# Patient Record
Sex: FEMALE | Race: WHITE | NOT HISPANIC OR LATINO | Employment: FULL TIME | RURAL
[De-identification: names, ages, dates, MRNs, and addresses within clinical notes are randomized per-mention and may not be internally consistent; named-entity substitution may affect disease eponyms.]

---
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
## 2021-08-03 ENCOUNTER — OFFICE VISIT (OUTPATIENT)
Dept: FAMILY MEDICINE CLINIC | Facility: CLINIC | Age: 21
End: 2021-08-03

## 2021-08-03 ENCOUNTER — LAB (OUTPATIENT)
Dept: LAB | Facility: HOSPITAL | Age: 21
End: 2021-08-03

## 2021-08-03 VITALS
HEART RATE: 84 BPM | WEIGHT: 105.2 LBS | OXYGEN SATURATION: 99 % | TEMPERATURE: 98.4 F | DIASTOLIC BLOOD PRESSURE: 60 MMHG | SYSTOLIC BLOOD PRESSURE: 95 MMHG | BODY MASS INDEX: 17.96 KG/M2 | HEIGHT: 64 IN

## 2021-08-03 DIAGNOSIS — Z30.09 COUNSELING FOR BIRTH CONTROL REGARDING INTRAUTERINE DEVICE (IUD): ICD-10-CM

## 2021-08-03 DIAGNOSIS — E55.9 VITAMIN D DEFICIENCY: ICD-10-CM

## 2021-08-03 DIAGNOSIS — E53.8 B12 DEFICIENCY: ICD-10-CM

## 2021-08-03 DIAGNOSIS — N93.9 ABNORMAL UTERINE BLEEDING (AUB): Primary | ICD-10-CM

## 2021-08-03 LAB
B-HCG UR QL: NEGATIVE
INTERNAL NEGATIVE CONTROL: NORMAL
INTERNAL POSITIVE CONTROL: NORMAL
Lab: NORMAL

## 2021-08-03 PROCEDURE — 83002 ASSAY OF GONADOTROPIN (LH): CPT | Performed by: STUDENT IN AN ORGANIZED HEALTH CARE EDUCATION/TRAINING PROGRAM

## 2021-08-03 PROCEDURE — 82306 VITAMIN D 25 HYDROXY: CPT | Performed by: STUDENT IN AN ORGANIZED HEALTH CARE EDUCATION/TRAINING PROGRAM

## 2021-08-03 PROCEDURE — 84443 ASSAY THYROID STIM HORMONE: CPT | Performed by: STUDENT IN AN ORGANIZED HEALTH CARE EDUCATION/TRAINING PROGRAM

## 2021-08-03 PROCEDURE — 83001 ASSAY OF GONADOTROPIN (FSH): CPT | Performed by: STUDENT IN AN ORGANIZED HEALTH CARE EDUCATION/TRAINING PROGRAM

## 2021-08-03 PROCEDURE — 85025 COMPLETE CBC W/AUTO DIFF WBC: CPT | Performed by: STUDENT IN AN ORGANIZED HEALTH CARE EDUCATION/TRAINING PROGRAM

## 2021-08-03 PROCEDURE — 81025 URINE PREGNANCY TEST: CPT | Performed by: STUDENT IN AN ORGANIZED HEALTH CARE EDUCATION/TRAINING PROGRAM

## 2021-08-03 PROCEDURE — 82670 ASSAY OF TOTAL ESTRADIOL: CPT | Performed by: STUDENT IN AN ORGANIZED HEALTH CARE EDUCATION/TRAINING PROGRAM

## 2021-08-03 PROCEDURE — 80053 COMPREHEN METABOLIC PANEL: CPT | Performed by: STUDENT IN AN ORGANIZED HEALTH CARE EDUCATION/TRAINING PROGRAM

## 2021-08-03 PROCEDURE — 84146 ASSAY OF PROLACTIN: CPT | Performed by: STUDENT IN AN ORGANIZED HEALTH CARE EDUCATION/TRAINING PROGRAM

## 2021-08-03 PROCEDURE — 99215 OFFICE O/P EST HI 40 MIN: CPT | Performed by: STUDENT IN AN ORGANIZED HEALTH CARE EDUCATION/TRAINING PROGRAM

## 2021-08-03 RX ORDER — ERGOCALCIFEROL 1.25 MG/1
50000 CAPSULE ORAL WEEKLY
COMMUNITY

## 2021-08-03 NOTE — PROGRESS NOTES
"Subjective:  Whit Chapin is a 20 y.o. female who presents for     AUB; states has never been an issue before, recently gave birth ~ 7 months ago, had some issues for the first 1-2 cycles postpartum but things went back to normal. However, this past cycle has been abnormal. States LMP started the 15th of last month, lasted 7 days, normal flow, usually heavy first day, uses ~ 4 per day, never bleeds through her pads. On the 24th ~ 2 days later had some pink, purple colored discharge for an additional 7 days. States was very light, denied changes in her daily routine, no increased sexual activity, no issues with dyspareunia, vaginal discharge, lad, rash, fever, chills. No COVID symptoms or exposures, did not get the vaccine. No fatigue, SOA, chest pain, ROSE. No issues with lactation, no complication with delivery. Was a NVD, only issue was low blood pressure, states this is a chronic issue. Takes prenatal vitamins, Vitamin b12, Vitamin D and albuterol as needed (~ once every couple of months.) Was on birth control in the past for desire to gain weight last year. Denied painful menses or menorrhagia. Was prescribed birth control, but has not begun. Has never had migraines with aura, blood clots, does vape.      Patient Active Problem List   Diagnosis   • Acute bronchitis   • Sleep disturbance   • Hx of dizziness     Vitals:    Vitals:    08/03/21 1322   BP: 95/60   BP Location: Left arm   Patient Position: Sitting   Cuff Size: Adult   Pulse: 84   Temp: 98.4 °F (36.9 °C)   SpO2: 99%   Weight: 47.7 kg (105 lb 3.2 oz)   Height: 162.6 cm (64\")     Body mass index is 18.06 kg/m².    Current Outpatient Medications:   •  albuterol (PROAIR RESPICLICK) 108 (90 Base) MCG/ACT inhaler, Inhale 2 puffs Every 4 (Four) Hours As Needed for Wheezing., Disp: 1 inhaler, Rfl: 5  •  Cyanocobalamin (B-12) 2500 MCG sublingual tablet, Place 1 tablet under the tongue Daily., Disp: , Rfl:   •  hydrOXYzine (ATARAX) 25 MG tablet, 1-2 tabs po " QHS prn sleep, Disp: 60 tablet, Rfl: 0  •  Magnesium Gluconate (MAGONATE BARBARA PO), Take 1 tablet by mouth Daily., Disp: , Rfl:   •  vitamin D (ERGOCALCIFEROL) 1.25 MG (31224 UT) capsule capsule, Take 50,000 Units by mouth 1 (One) Time Per Week., Disp: , Rfl:     Patient Active Problem List   Diagnosis   • Acute bronchitis   • Sleep disturbance   • Hx of dizziness     History reviewed. No pertinent surgical history.  Social History     Socioeconomic History   • Marital status: Single     Spouse name: Not on file   • Number of children: Not on file   • Years of education: Not on file   • Highest education level: Not on file   Tobacco Use   • Smoking status: Current Every Day Smoker   • Smokeless tobacco: Never Used   Vaping Use   • Vaping Use: Every day   • Start date: 2018   • Substances: Nicotine   • Devices: Disposable   • Passive vaping exposure Yes   Substance and Sexual Activity   • Alcohol use: Never   • Drug use: Never   • Sexual activity: Defer     History reviewed. No pertinent family history.  No visits with results within 6 Month(s) from this visit.   Latest known visit with results is:   Office Visit on 2019   Component Date Value Ref Range Status   • Glucose 2019 88  65 - 99 mg/dL Final   • BUN 2019 10  5 - 18 mg/dL Final   • Creatinine 2019 0.73  0.57 - 1.00 mg/dL Final   • Sodium 2019 142  136 - 145 mmol/L Final   • Potassium 2019 4.1  3.5 - 5.2 mmol/L Final   • Chloride 2019 105  98 - 107 mmol/L Final   • CO2 2019 23.8  22.0 - 29.0 mmol/L Final   • Calcium 2019 9.6  8.4 - 10.2 mg/dL Final   • Total Protein 2019 7.0  6.0 - 8.0 g/dL Final   • Albumin 2019 4.60* 3.20 - 4.50 g/dL Final   • ALT (SGPT) 2019 11  8 - 29 U/L Final   • AST (SGOT) 2019 16  14 - 37 U/L Final   • Alkaline Phosphatase 2019 58  45 - 101 U/L Final   • Total Bilirubin 2019 0.4  0.2 - 1.0 mg/dL Final   • eGFR Non African Amer 2019   >60  mL/min/1.73 Final    Unable to calculate GFR, patient age <=18.   • eGFR   Amer 08/02/2019   >60 mL/min/1.73 Final    Unable to calculate GFR, patient age <=18.   • Globulin 08/02/2019 2.4  gm/dL Final   • A/G Ratio 08/02/2019 1.9  g/dL Final   • BUN/Creatinine Ratio 08/02/2019 13.7  7.0 - 25.0 Final   • Anion Gap 08/02/2019 13.2  5.0 - 15.0 mmol/L Final   • Hemoglobin A1C 08/02/2019 5.20  4.80 - 5.60 % Final   • Iron 08/02/2019 97  37 - 145 mcg/dL Final   • Iron Saturation 08/02/2019 24  20 - 50 % Final   • Transferrin 08/02/2019 275  200 - 360 mg/dL Final   • TIBC 08/02/2019 410  mcg/dL Final   • TSH 08/02/2019 2.180  0.500 - 4.300 mIU/mL Final   • WBC 08/02/2019 5.18  3.40 - 10.80 10*3/mm3 Final   • RBC 08/02/2019 4.55  3.77 - 5.28 10*6/mm3 Final   • Hemoglobin 08/02/2019 13.0  12.0 - 15.9 g/dL Final   • Hematocrit 08/02/2019 40.0  34.0 - 46.6 % Final   • MCV 08/02/2019 87.9  79.0 - 97.0 fL Final   • MCH 08/02/2019 28.6  26.6 - 33.0 pg Final   • MCHC 08/02/2019 32.5  31.5 - 35.7 g/dL Final   • RDW 08/02/2019 12.8  12.3 - 15.4 % Final   • RDW-SD 08/02/2019 41.0  37.0 - 54.0 fl Final   • MPV 08/02/2019 12.0  6.0 - 12.0 fL Final   • Platelets 08/02/2019 280  140 - 450 10*3/mm3 Final   • Neutrophil % 08/02/2019 36.3* 42.7 - 76.0 % Final   • Lymphocyte % 08/02/2019 42.3  19.6 - 45.3 % Final   • Monocyte % 08/02/2019 9.8  5.0 - 12.0 % Final   • Eosinophil % 08/02/2019 10.0* 0.3 - 6.2 % Final   • Basophil % 08/02/2019 1.4  0.0 - 2.0 % Final   • Immature Grans % 08/02/2019 0.2  0.0 - 0.5 % Final   • Neutrophils, Absolute 08/02/2019 1.88  1.70 - 7.00 10*3/mm3 Final   • Lymphocytes, Absolute 08/02/2019 2.19  0.70 - 3.10 10*3/mm3 Final   • Monocytes, Absolute 08/02/2019 0.51  0.10 - 0.90 10*3/mm3 Final   • Eosinophils, Absolute 08/02/2019 0.52* 0.00 - 0.40 10*3/mm3 Final   • Basophils, Absolute 08/02/2019 0.07  0.00 - 0.30 10*3/mm3 Final   • Immature Grans, Absolute 08/02/2019 0.01  0.00 - 0.05 10*3/mm3 Final   •  nRBC 08/02/2019 0.0  0.0 - 0.2 /100 WBC Final      No image results found.      [unfilled]  Immunization History   Administered Date(s) Administered   • Tdap 11/09/2020     The following portions of the patient's history were reviewed and updated as appropriate: allergies, current medications, past family history, past medical history, past social history, past surgical history and problem list.    PHQ-9 Total Score: 0         Physical Exam  Constitutional:       Appearance: Normal appearance.   HENT:      Head: Normocephalic and atraumatic.      Right Ear: External ear normal.      Left Ear: External ear normal.   Eyes:      General:         Right eye: No discharge.         Left eye: No discharge.      Conjunctiva/sclera: Conjunctivae normal.   Cardiovascular:      Rate and Rhythm: Normal rate and regular rhythm.      Pulses: Normal pulses.      Heart sounds: Normal heart sounds. No murmur heard.     Pulmonary:      Effort: Pulmonary effort is normal. No respiratory distress.      Breath sounds: Normal breath sounds.   Abdominal:      General: There is no distension.      Palpations: Abdomen is soft.      Tenderness: There is no abdominal tenderness.   Genitourinary:     Comments: Patient deferred  Musculoskeletal:      Cervical back: Normal range of motion.      Right lower leg: No edema.      Left lower leg: No edema.   Lymphadenopathy:      Cervical: No cervical adenopathy.   Neurological:      Mental Status: She is alert. Mental status is at baseline.   Psychiatric:         Mood and Affect: Mood normal.         Behavior: Behavior normal.       Assessment/Plan    Diagnosis Plan   1. Abnormal uterine bleeding (AUB)  CBC & Differential    Comprehensive Metabolic Panel    Vitamin D 25 Hydroxy    TSH Rfx On Abnormal To Free T4    Ambulatory Referral to Obstetrics / Gynecology    POCT pregnancy, urine    Follicle stimulating hormone    Prolactin    Luteinizing hormone    Estradiol   2. Counseling for birth control  regarding intrauterine device (IUD)  Ambulatory Referral to Obstetrics / Gynecology   3. B12 deficiency  Cyanocobalamin (B-12) 2500 MCG sublingual tablet    Vitamin B12   4. Vitamin D deficiency  vitamin D (ERGOCALCIFEROL) 1.25 MG (32431 UT) capsule capsule    Vitamin D 25 Hydroxy      Orders Placed This Encounter   Procedures   • Comprehensive Metabolic Panel     Order Specific Question:   Release to patient     Answer:   Immediate   • Vitamin D 25 Hydroxy     Order Specific Question:   Release to patient     Answer:   Immediate   • TSH Rfx On Abnormal To Free T4     Order Specific Question:   Release to patient     Answer:   Immediate   • Follicle stimulating hormone     Order Specific Question:   Release to patient     Answer:   Immediate   • Prolactin     Order Specific Question:   Release to patient     Answer:   Immediate   • Luteinizing hormone     Order Specific Question:   Release to patient     Answer:   Immediate   • Estradiol     Order Specific Question:   Release to patient     Answer:   Immediate   • CBC Auto Differential   • Vitamin B12     Order Specific Question:   Release to patient     Answer:   Immediate   • Ambulatory Referral to Obstetrics / Gynecology     Referral Priority:   Routine     Referral Type:   Consultation     Referral Reason:   Specialty Services Required     Requested Specialty:   Obstetrics and Gynecology     Number of Visits Requested:   1   • POCT pregnancy, urine     Order Specific Question:   Release to patient     Answer:   Immediate   • CBC & Differential     Order Specific Question:   Manual Differential     Answer:   No     Abnormal uterine bleeding; has never happened before, as such, labs as above, no issues with lactation, and has had regular cycles postpartum. No dyspareunia, fever, chills, abdominal pain, tenderness, vaginal discharge, reassuring.  Offered STI screening, patient declined.  Labs as above, discussed importance of birth control especially long-acting  reversible contraceptive such as IUD, patient to consider, will refer to OB/GYN for possible placement, further management.  Declined medication refill for birth control at this time, states has one at the pharmacy from her previous provider in [facility],  will begin today, counseled on use, possible adverse effects, no history of clotting, migraines with aura, tobacco use, reassuring.  UPT negative today, reassuring.    Patient is new to me, as such, reviewed available previous records and lab work.  Normal TSH in 2019, prenatal records not available, will obtain.    Total time examining evaluating the patient, completing orders and counseling was 65min.         This document has been electronically signed by Gabriel Dennis MD on August 3, 2021 14:29 CDT

## 2021-08-03 NOTE — PATIENT INSTRUCTIONS
Levonorgestrel intrauterine device (IUD)  What is this medicine?  LEVONORGESTREL IUD (LAWANDA grewal) is a contraceptive (birth control) device. The device is placed inside the uterus by a healthcare professional. It is used to prevent pregnancy. This device can also be used to treat heavy bleeding that occurs during your period.  This medicine may be used for other purposes; ask your health care provider or pharmacist if you have questions.  COMMON BRAND NAME(S): Domenicena, LILETTA, Mirena, Ailyn  What should I tell my health care provider before I take this medicine?  They need to know if you have any of these conditions:  · abnormal Pap smear  · cancer of the breast, uterus, or cervix  · diabetes  · endometritis  · genital or pelvic infection now or in the past  · have more than one sexual partner or your partner has more than one partner  · heart disease  · history of an ectopic or tubal pregnancy  · immune system problems  · IUD in place  · liver disease or tumor  · problems with blood clots or take blood-thinners  · seizures  · use intravenous drugs  · uterus of unusual shape  · vaginal bleeding that has not been explained  · an unusual or allergic reaction to levonorgestrel, other hormones, silicone, or polyethylene, medicines, foods, dyes, or preservatives  · pregnant or trying to get pregnant  · breast-feeding  How should I use this medicine?  This device is placed inside the uterus by a health care professional.  Talk to your pediatrician regarding the use of this medicine in children. Special care may be needed.  Overdosage: If you think you have taken too much of this medicine contact a poison control center or emergency room at once.  NOTE: This medicine is only for you. Do not share this medicine with others.  What if I miss a dose?  This does not apply. Depending on the brand of device you have inserted, the device will need to be replaced every 3 to 6 years if you wish to continue using this type  of birth control.  What may interact with this medicine?  Do not take this medicine with any of the following medications:  · amprenavir  · bosentan  · fosamprenavir  This medicine may also interact with the following medications:  · aprepitant  · armodafinil  · barbiturate medicines for inducing sleep or treating seizures  · bexarotene  · boceprevir  · griseofulvin  · medicines to treat seizures like carbamazepine, ethotoin, felbamate, oxcarbazepine, phenytoin, topiramate  · modafinil  · pioglitazone  · rifabutin  · rifampin  · rifapentine  · some medicines to treat HIV infection like atazanavir, efavirenz, indinavir, lopinavir, nelfinavir, tipranavir, ritonavir  · Ayo's wort  · warfarin  This list may not describe all possible interactions. Give your health care provider a list of all the medicines, herbs, non-prescription drugs, or dietary supplements you use. Also tell them if you smoke, drink alcohol, or use illegal drugs. Some items may interact with your medicine.  What should I watch for while using this medicine?  Visit your doctor or health care professional for regular check ups. See your doctor if you or your partner has sexual contact with others, becomes HIV positive, or gets a sexual transmitted disease.  This product does not protect you against HIV infection (AIDS) or other sexually transmitted diseases.  You can check the placement of the IUD yourself by reaching up to the top of your vagina with clean fingers to feel the threads. Do not pull on the threads. It is a good habit to check placement after each menstrual period. Call your doctor right away if you feel more of the IUD than just the threads or if you cannot feel the threads at all.  The IUD may come out by itself. You may become pregnant if the device comes out. If you notice that the IUD has come out use a backup birth control method like condoms and call your health care provider.  Using tampons will not change the position of the  IUD and are okay to use during your period.  This IUD can be safely scanned with magnetic resonance imaging (MRI) only under specific conditions. Before you have an MRI, tell your healthcare provider that you have an IUD in place, and which type of IUD you have in place.  What side effects may I notice from receiving this medicine?  Side effects that you should report to your doctor or health care professional as soon as possible:  · allergic reactions like skin rash, itching or hives, swelling of the face, lips, or tongue  · fever, flu-like symptoms  · genital sores  · high blood pressure  · no menstrual period for 6 weeks during use  · pain, swelling, warmth in the leg  · pelvic pain or tenderness  · severe or sudden headache  · signs of pregnancy  · stomach cramping  · sudden shortness of breath  · trouble with balance, talking, or walking  · unusual vaginal bleeding, discharge  · yellowing of the eyes or skin  Side effects that usually do not require medical attention (report to your doctor or health care professional if they continue or are bothersome):  · acne  · breast pain  · change in sex drive or performance  · changes in weight  · cramping, dizziness, or faintness while the device is being inserted  · headache  · irregular menstrual bleeding within first 3 to 6 months of use  · nausea  This list may not describe all possible side effects. Call your doctor for medical advice about side effects. You may report side effects to FDA at 3-565-FDA-1702.  Where should I keep my medicine?  This does not apply.  NOTE: This sheet is a summary. It may not cover all possible information. If you have questions about this medicine, talk to your doctor, pharmacist, or health care provider.  © 2021 Elsevier/Gold Standard (2019-10-29 13:22:01)    Abnormal Uterine Bleeding  Abnormal uterine bleeding means bleeding more than usual from your womb (uterus). It can include:  · Bleeding between menstrual periods.  · Bleeding  after sex.  · Bleeding that is heavier than normal.  · Menstrual periods that last longer than usual.  · Bleeding after you have stopped having your menstrual period (menopause).  There are many problems that may cause this. You should see a doctor for any kind of bleeding that is not normal. Treatment depends on the cause of the bleeding.  Follow these instructions at home:  Medicines  · Take over-the-counter and prescription medicines only as told by your doctor.  · Tell your doctor about other medicines that you take.  ? If told by your doctor, stop taking aspirin or medicines that have aspirin in them. These medicines can make you bleed more.  · You may be given iron pills to replace iron that your body loses because of this condition. Take them as told by your doctor.  Managing constipation  If you are taking iron pills, you may have trouble pooping (constipation). To prevent or treat trouble pooping, you may need to:  · Drink enough fluid to keep your pee (urine) pale yellow.  · Take over-the-counter or prescription medicines.  · Eat foods that are high in fiber. These include beans, whole grains, and fresh fruits and vegetables.  · Limit foods that are high in fat and sugar. These include fried or sweet foods.  General instructions  · Watch your condition for any changes.  · Do not use tampons, douche, or have sex, if your doctor tells you not to.  · Change your pads often.  · Get regular exams. This includes pelvic exams and cervical cancer screenings.  ? It is up to you to get the results of any tests that are done. Ask your doctor, or the department that is doing the tests, when your results will be ready.  · Keep all follow-up visits as told by your doctor. This is important.  Contact a doctor if:  · The bleeding lasts more than 1 week.  · You feel dizzy at times.  · You feel like you may vomit (nausea).  · You vomit.  · You feel light-headed or weak.  · Your symptoms get worse.  Get help right away  if:  · You pass out.  · You have to change pads every hour.  · You have pain in your belly.  · You have a fever or chills.  · You get sweaty.  · You get weak.  · You pass large blood clots from your vagina.  Summary  · Abnormal uterine bleeding means bleeding more than usual from your womb (uterus).  · Any kind of bleeding that is not normal should be checked by a doctor.  · Treatment depends on the cause of the bleeding.  · Get help right away if you pass out, you have to change pads every hour, or you pass large blood clots from your vagina.  This information is not intended to replace advice given to you by your health care provider. Make sure you discuss any questions you have with your health care provider.  Document Revised: 10/20/2020 Document Reviewed: 10/20/2020  Elsevier Patient Education © 2021 Elsevier Inc.

## 2021-08-04 LAB
25(OH)D3 SERPL-MCNC: 23.4 NG/ML
ALBUMIN SERPL-MCNC: 4.5 G/DL (ref 3.5–5.2)
ALBUMIN/GLOB SERPL: 1.9 G/DL
ALP SERPL-CCNC: 75 U/L (ref 39–117)
ALT SERPL W P-5'-P-CCNC: 11 U/L (ref 1–33)
ANION GAP SERPL CALCULATED.3IONS-SCNC: 7.8 MMOL/L (ref 5–15)
AST SERPL-CCNC: 13 U/L (ref 1–32)
BASOPHILS # BLD AUTO: 0.05 10*3/MM3 (ref 0–0.2)
BASOPHILS NFR BLD AUTO: 0.8 % (ref 0–1.5)
BILIRUB SERPL-MCNC: 0.3 MG/DL (ref 0–1.2)
BUN SERPL-MCNC: 7 MG/DL (ref 6–20)
BUN/CREAT SERPL: 9.6 (ref 7–25)
CALCIUM SPEC-SCNC: 9.3 MG/DL (ref 8.6–10.5)
CHLORIDE SERPL-SCNC: 106 MMOL/L (ref 98–107)
CO2 SERPL-SCNC: 26.2 MMOL/L (ref 22–29)
CREAT SERPL-MCNC: 0.73 MG/DL (ref 0.57–1)
DEPRECATED RDW RBC AUTO: 43.1 FL (ref 37–54)
EOSINOPHIL # BLD AUTO: 0.08 10*3/MM3 (ref 0–0.4)
EOSINOPHIL NFR BLD AUTO: 1.3 % (ref 0.3–6.2)
ERYTHROCYTE [DISTWIDTH] IN BLOOD BY AUTOMATED COUNT: 15.5 % (ref 12.3–15.4)
ESTRADIOL SERPL HS-MCNC: 211 PG/ML
FSH SERPL-ACNC: 8.41 MIU/ML
GFR SERPL CREATININE-BSD FRML MDRD: 103 ML/MIN/1.73
GLOBULIN UR ELPH-MCNC: 2.4 GM/DL
GLUCOSE SERPL-MCNC: 80 MG/DL (ref 65–99)
HCT VFR BLD AUTO: 36.8 % (ref 34–46.6)
HGB BLD-MCNC: 11.7 G/DL (ref 12–15.9)
IMM GRANULOCYTES # BLD AUTO: 0.02 10*3/MM3 (ref 0–0.05)
IMM GRANULOCYTES NFR BLD AUTO: 0.3 % (ref 0–0.5)
LH SERPL-ACNC: 28 MIU/ML
LYMPHOCYTES # BLD AUTO: 1.54 10*3/MM3 (ref 0.7–3.1)
LYMPHOCYTES NFR BLD AUTO: 24.4 % (ref 19.6–45.3)
MCH RBC QN AUTO: 24.8 PG (ref 26.6–33)
MCHC RBC AUTO-ENTMCNC: 31.8 G/DL (ref 31.5–35.7)
MCV RBC AUTO: 78 FL (ref 79–97)
MONOCYTES # BLD AUTO: 0.5 10*3/MM3 (ref 0.1–0.9)
MONOCYTES NFR BLD AUTO: 7.9 % (ref 5–12)
NEUTROPHILS NFR BLD AUTO: 4.13 10*3/MM3 (ref 1.7–7)
NEUTROPHILS NFR BLD AUTO: 65.3 % (ref 42.7–76)
NRBC BLD AUTO-RTO: 0 /100 WBC (ref 0–0.2)
PLATELET # BLD AUTO: 296 10*3/MM3 (ref 140–450)
PMV BLD AUTO: 12.4 FL (ref 6–12)
POTASSIUM SERPL-SCNC: 4.6 MMOL/L (ref 3.5–5.2)
PROLACTIN SERPL-MCNC: 17.4 NG/ML (ref 4.79–23.3)
PROT SERPL-MCNC: 6.9 G/DL (ref 6–8.5)
RBC # BLD AUTO: 4.72 10*6/MM3 (ref 3.77–5.28)
SODIUM SERPL-SCNC: 140 MMOL/L (ref 136–145)
TSH SERPL DL<=0.05 MIU/L-ACNC: 0.64 UIU/ML (ref 0.27–4.2)
WBC # BLD AUTO: 6.32 10*3/MM3 (ref 3.4–10.8)

## 2021-08-10 ENCOUNTER — TELEPHONE (OUTPATIENT)
Dept: FAMILY MEDICINE CLINIC | Facility: CLINIC | Age: 21
End: 2021-08-10

## 2021-08-10 NOTE — TELEPHONE ENCOUNTER
----- Message from Gabriel Dennis MD sent at 8/9/2021 11:37 AM CDT -----  Overall, labs reassuring.  No signs of hormonal imbalance.  However, you do have a mild anemia, likely due to iron deficiency.  As such, would recommend repeat testing at follow-up to confirm diagnosis and to further discuss treatment options.

## 2021-09-13 ENCOUNTER — OFFICE VISIT (OUTPATIENT)
Dept: OBSTETRICS AND GYNECOLOGY | Facility: CLINIC | Age: 21
End: 2021-09-13

## 2021-09-13 VITALS
SYSTOLIC BLOOD PRESSURE: 118 MMHG | HEIGHT: 64 IN | BODY MASS INDEX: 18.1 KG/M2 | WEIGHT: 106 LBS | DIASTOLIC BLOOD PRESSURE: 60 MMHG

## 2021-09-13 DIAGNOSIS — N94.10 DYSPAREUNIA IN FEMALE: Primary | ICD-10-CM

## 2021-09-13 DIAGNOSIS — N92.1 BREAKTHROUGH BLEEDING: ICD-10-CM

## 2021-09-13 PROCEDURE — 87661 TRICHOMONAS VAGINALIS AMPLIF: CPT | Performed by: NURSE PRACTITIONER

## 2021-09-13 PROCEDURE — 87591 N.GONORRHOEAE DNA AMP PROB: CPT | Performed by: NURSE PRACTITIONER

## 2021-09-13 PROCEDURE — 87660 TRICHOMONAS VAGIN DIR PROBE: CPT | Performed by: NURSE PRACTITIONER

## 2021-09-13 PROCEDURE — 87491 CHLMYD TRACH DNA AMP PROBE: CPT | Performed by: NURSE PRACTITIONER

## 2021-09-13 PROCEDURE — 87510 GARDNER VAG DNA DIR PROBE: CPT | Performed by: NURSE PRACTITIONER

## 2021-09-13 PROCEDURE — 99213 OFFICE O/P EST LOW 20 MIN: CPT | Performed by: NURSE PRACTITIONER

## 2021-09-13 PROCEDURE — 87480 CANDIDA DNA DIR PROBE: CPT | Performed by: NURSE PRACTITIONER

## 2021-09-13 RX ORDER — PNV NO.95/FERROUS FUM/FOLIC AC 28MG-0.8MG
TABLET ORAL
COMMUNITY

## 2021-09-13 NOTE — PROGRESS NOTES
Subjective   Chief Complaint   Patient presents with   • Menstrual Problem     Iftikhar Chapin is a 20 y.o. year old No obstetric history on file. presenting to be seen because of pain with intercourse, irregular bleeding following her periods and pain after sex.   Current birth control method: condoms.     Patient's last menstrual period was 08/19/2021 (approximate).    Past 6 month menstrual history:    Cycle Frequency: regular, predictable and consistent every 28 - 32 days   Menstrual cycle character: flow is typically moderately heavy   Cycle Duration: 5 - 7   Number of heavy days of flows: 2   Dysmenorrhea: moderate and is not affecting her activities of daily living   PMS: mild and is not affecting her activities of daily living   Intermenstrual bleeding present: {yes   Post-coital bleeding present: no; but does have post coital pain and cramping for 2-3 days     No Additional Complaints Reported    The following portions of the patient's history were reviewed and updated as appropriate:problem list, current medications, allergies, past family history, past medical history, past social history and past surgical history    Social History    Tobacco Use      Smoking status: Current Every Day Smoker      Smokeless tobacco: Never Used    Review of Systems   Constitutional: Negative for activity change, appetite change, diaphoresis, fatigue, unexpected weight gain and unexpected weight loss.   Respiratory: Negative for chest tightness and shortness of breath.    Cardiovascular: Negative for chest pain and palpitations.   Gastrointestinal: Negative for abdominal distention, abdominal pain, constipation and diarrhea.   Genitourinary: Positive for dyspareunia and menstrual problem. Negative for amenorrhea, breast discharge, breast lump, breast pain, decreased libido, decreased urine volume, difficulty urinating, dysuria, pelvic pain, pelvic pressure, urgency, urinary incontinence, vaginal bleeding, vaginal  "discharge and vaginal pain.   Musculoskeletal: Negative for myalgias.   Skin: Negative for color change, dry skin and skin lesions.   Neurological: Negative for light-headedness and headache.   Psychiatric/Behavioral: Negative for agitation, dysphoric mood, sleep disturbance, depressed mood and stress. The patient is not nervous/anxious.         Objective   /60   Ht 162.6 cm (64\")   Wt 48.1 kg (106 lb)   LMP 08/19/2021 (Approximate)   Breastfeeding No   BMI 18.19 kg/m²     Physical Exam  Vitals and nursing note reviewed.   Constitutional:       General: She is awake. She is not in acute distress.     Appearance: Normal appearance. She is well-developed, well-groomed and normal weight. She is not ill-appearing, toxic-appearing or diaphoretic.   Genitourinary:     General: Normal vulva.      Exam position: Lithotomy position.      Pubic Area: No rash.       Ted stage (genital): 5.      Labia:         Right: No rash, tenderness, lesion or injury.         Left: No rash, lesion or injury.       Urethra: No prolapse, urethral pain, urethral swelling or urethral lesion.      Cervix: No cervical motion tenderness.      Uterus: Tender. Not deviated, not enlarged, not fixed and no uterine prolapse.       Adnexa:         Right: No mass, tenderness or fullness.          Left: No mass, tenderness or fullness.        Comments: Vag panel and gc/ct/trich swab obtained.  Neurological:      Mental Status: She is alert.   Psychiatric:         Attention and Perception: Attention and perception normal.         Mood and Affect: Mood and affect normal.         Speech: Speech normal.         Behavior: Behavior normal. Behavior is cooperative.           Lab Review   No data reviewed    Imaging   No data reviewed           No orders of the defined types were placed in this encounter.      Diagnoses and all orders for this visit:    Dyspareunia in female  -     Chlamydia trachomatis, Neisseria gonorrhoeae, Trichomonas vaginalis, " PCR - Swab, Cervix  -     Gardnerella vaginalis, Trichomonas vaginalis, Candida albicans, DNA - Swab, Vagina  -     US Non-ob Transvaginal; Future    Breakthrough bleeding    Other orders  -     ferrous sulfate 325 (65 Fe) MG tablet; Take  by mouth.      R/O infections. Pelvic u/s with [facility] ASAP. Will call with results as soon as available.       This note was electronically signed.    Milana Lemon, NAHEED  September 13, 2021

## 2021-09-14 LAB
CANDIDA ALBICANS: NEGATIVE
GARDNERELLA VAGINALIS: POSITIVE
T VAGINALIS DNA VAG QL PROBE+SIG AMP: NEGATIVE

## 2021-09-15 ENCOUNTER — TELEPHONE (OUTPATIENT)
Dept: OBSTETRICS AND GYNECOLOGY | Facility: CLINIC | Age: 21
End: 2021-09-15

## 2021-09-15 LAB
C TRACH RRNA CVX QL NAA+PROBE: NEGATIVE
N GONORRHOEA RRNA SPEC QL NAA+PROBE: NEGATIVE
TRICHOMONAS VAGINALIS PCR: NEGATIVE

## 2021-09-15 RX ORDER — METRONIDAZOLE 500 MG/1
500 TABLET ORAL 2 TIMES DAILY
Qty: 14 TABLET | Refills: 0 | Status: SHIPPED | OUTPATIENT
Start: 2021-09-15 | End: 2021-09-22

## 2021-09-15 NOTE — TELEPHONE ENCOUNTER
----- Message from NAHEED Baeza sent at 9/15/2021 12:51 PM CDT -----  Positive for BV; RX sent for flagyl 500mg BID for 7 days

## 2021-09-20 ENCOUNTER — TELEPHONE (OUTPATIENT)
Dept: OBSTETRICS AND GYNECOLOGY | Facility: CLINIC | Age: 21
End: 2021-09-20

## 2021-09-20 DIAGNOSIS — N94.10 DYSPAREUNIA IN FEMALE: ICD-10-CM

## 2021-09-21 RX ORDER — NORETHINDRONE ACETATE AND ETHINYL ESTRADIOL 1MG-20(21)
1 KIT ORAL DAILY
Qty: 28 TABLET | Refills: 12 | Status: SHIPPED | OUTPATIENT
Start: 2021-09-21 | End: 2022-09-21

## 2023-05-03 ENCOUNTER — OFFICE VISIT (OUTPATIENT)
Dept: OBSTETRICS AND GYNECOLOGY | Facility: CLINIC | Age: 23
End: 2023-05-03
Payer: COMMERCIAL

## 2023-05-03 VITALS
BODY MASS INDEX: 17.75 KG/M2 | SYSTOLIC BLOOD PRESSURE: 98 MMHG | WEIGHT: 104 LBS | DIASTOLIC BLOOD PRESSURE: 60 MMHG | HEIGHT: 64 IN

## 2023-05-03 DIAGNOSIS — R10.2 PELVIC PAIN: Primary | ICD-10-CM

## 2023-05-03 DIAGNOSIS — N94.89 PELVIC CONGESTIVE SYNDROME: ICD-10-CM

## 2023-05-03 DIAGNOSIS — Z30.011 ENCOUNTER FOR INITIAL PRESCRIPTION OF CONTRACEPTIVE PILLS: ICD-10-CM

## 2023-05-03 PROCEDURE — 99213 OFFICE O/P EST LOW 20 MIN: CPT | Performed by: NURSE PRACTITIONER

## 2023-05-03 RX ORDER — DESOGESTREL AND ETHINYL ESTRADIOL 21-5 (28)
1 KIT ORAL DAILY
Qty: 84 TABLET | Refills: 4 | Status: SHIPPED | OUTPATIENT
Start: 2023-05-03 | End: 2024-05-02

## 2023-05-03 NOTE — PROGRESS NOTES
Chelle Chapin is a 22 y.o. female presenting for pelvic pain.     History of Present Illness  Patient presents with worsening pelvic pain. She was seen for this problem 9/13/2021. At the time she was having pain during and after intercourse. US 9/20/21 showed prominent blood vessels throughout the uterus suggestive of pelvic congestion syndrome. She says that after this visit the pain seemed to calm down for a while. She then became pregnant in early 2022 and could not distinguish whether her discomfort was associated with normal discomforts of pregnancy or the pelvic congestion syndrome. After giving birth October 2022, the pelvic pain got worse. She mentions that while the pain was bearable before, it now brings her to tears. The pain continues to occur during and after intercourse. However, she is now experiencing random sudden sharp pains that last a few seconds. These present bilaterally but the sides may change.     Denies pain with BM. Pain is not associated with menstrual cycles.     LMP: 4/29  Pelvic Pain  The patient's primary symptoms include pelvic pain. The patient's pertinent negatives include no vaginal discharge. This is a recurrent problem. The current episode started more than 1 year ago. The problem occurs intermittently. The problem has been gradually worsening. The pain is severe. The problem affects both sides. She is not pregnant. Pertinent negatives include no abdominal pain, constipation, diarrhea or dysuria. The symptoms are aggravated by intercourse. She is sexually active. No, her partner does not have an STD. Her menstrual history has been regular.       The following portions of the patient's history were reviewed and updated as appropriate: allergies, current medications, past family history, past medical history, past social history, past surgical history and problem list.    Review of Systems   Constitutional: Negative for activity change, appetite change, diaphoresis,  fatigue, unexpected weight gain and unexpected weight loss.   Respiratory: Negative for chest tightness and shortness of breath.    Cardiovascular: Negative for chest pain and palpitations.   Gastrointestinal: Negative for abdominal distention, abdominal pain, constipation and diarrhea.   Genitourinary: Positive for pelvic pain. Negative for amenorrhea, breast discharge, breast lump, breast pain, decreased libido, dyspareunia, dysuria, menstrual problem, vaginal bleeding, vaginal discharge and vaginal pain.   Musculoskeletal: Negative for myalgias.   Skin: Negative for color change, dry skin and skin lesions.   Neurological: Negative for light-headedness and headache.   Psychiatric/Behavioral: Negative for agitation, dysphoric mood, sleep disturbance, depressed mood and stress. The patient is not nervous/anxious.        Objective   Physical Exam  Vitals and nursing note reviewed.   Constitutional:       General: She is awake. She is not in acute distress.     Appearance: Normal appearance. She is well-developed and well-groomed. She is not ill-appearing, toxic-appearing or diaphoretic.   Cardiovascular:      Rate and Rhythm: Normal rate and regular rhythm.      Heart sounds: Normal heart sounds.   Pulmonary:      Effort: Pulmonary effort is normal.      Breath sounds: Normal breath sounds.   Skin:     General: Skin is warm and dry.   Neurological:      Mental Status: She is alert and oriented to person, place, and time.   Psychiatric:         Attention and Perception: Attention and perception normal.         Mood and Affect: Mood and affect normal.         Speech: Speech normal.         Behavior: Behavior normal. Behavior is cooperative.           Assessment & Plan   Diagnoses and all orders for this visit:    1. Pelvic congestive syndrome (Primary)    2. Pelvic pain    3. Encounter for initial prescription of contraceptive pills    Other orders  -     desogestrel-ethinyl estradiol (Kariva) 0.15-0.02/0.01 MG (21/5)  per tablet; Take 1 tablet by mouth Daily.  Dispense: 84 tablet; Refill: 4      Start with OCP in attempt to help pain. Explained that there is still a lot unknown about PCS and management is trial and error. If pain not improving with this OCP after 3 cycles she was encouraged to call and I'll make adjustments to her OCPs based on her undesired s/e. RBA and potential s/e of medication reviewed. Pt agrees to plan and v/u. Can add TVUS for reassessment if not improving after 3 months on OCPs.

## 2023-05-31 ENCOUNTER — PATIENT MESSAGE (OUTPATIENT)
Dept: OBSTETRICS AND GYNECOLOGY | Facility: CLINIC | Age: 23
End: 2023-05-31
Payer: COMMERCIAL

## 2023-06-06 NOTE — TELEPHONE ENCOUNTER
From: Whit Chapin  To: Milana Lemon  Sent: 5/31/2023 7:07 AM CDT  Subject: Period/ birth control     I’m on day 2 of my period and it’s still barely there and brown is that normal? Why was there 2 white pills then 5 green pills for the last week of my birth control?

## 2023-09-17 ENCOUNTER — PATIENT MESSAGE (OUTPATIENT)
Dept: OBSTETRICS AND GYNECOLOGY | Facility: CLINIC | Age: 23
End: 2023-09-17
Payer: COMMERCIAL

## 2023-09-19 NOTE — TELEPHONE ENCOUNTER
From: Whit Chapin  To: Milana Lemon  Sent: 9/17/2023 1:59 PM CDT  Subject: Birth control     Is there a way I can get on a birth control that stops my period?
